# Patient Record
Sex: MALE | Race: OTHER
[De-identification: names, ages, dates, MRNs, and addresses within clinical notes are randomized per-mention and may not be internally consistent; named-entity substitution may affect disease eponyms.]

---

## 2018-12-28 ENCOUNTER — HOSPITAL ENCOUNTER (INPATIENT)
Dept: HOSPITAL 72 - EMR | Age: 68
LOS: 4 days | Discharge: NURSING, REHAB OR LONG-TERM CARE | DRG: 101 | End: 2019-01-01
Payer: MEDICARE

## 2018-12-28 VITALS
HEIGHT: 64 IN | DIASTOLIC BLOOD PRESSURE: 67 MMHG | BODY MASS INDEX: 22.53 KG/M2 | WEIGHT: 132 LBS | SYSTOLIC BLOOD PRESSURE: 108 MMHG

## 2018-12-28 DIAGNOSIS — C79.31: ICD-10-CM

## 2018-12-28 DIAGNOSIS — C18.9: ICD-10-CM

## 2018-12-28 DIAGNOSIS — K21.9: ICD-10-CM

## 2018-12-28 DIAGNOSIS — N17.9: ICD-10-CM

## 2018-12-28 DIAGNOSIS — Z43.3: ICD-10-CM

## 2018-12-28 DIAGNOSIS — G40.89: Primary | ICD-10-CM

## 2018-12-28 DIAGNOSIS — I10: ICD-10-CM

## 2018-12-28 LAB
ADD MANUAL DIFF: YES
ALBUMIN SERPL-MCNC: 2.1 G/DL (ref 3.4–5)
ALBUMIN/GLOB SERPL: 0.5 {RATIO} (ref 1–2.7)
ALP SERPL-CCNC: 136 U/L (ref 46–116)
ALT SERPL-CCNC: 123 U/L (ref 12–78)
ANION GAP SERPL CALC-SCNC: 14 MMOL/L (ref 5–15)
APPEARANCE UR: CLEAR
APTT PPP: (no result) S
AST SERPL-CCNC: 35 U/L (ref 15–37)
BILIRUB SERPL-MCNC: 0.2 MG/DL (ref 0.2–1)
BUN SERPL-MCNC: 30 MG/DL (ref 7–18)
CALCIUM SERPL-MCNC: 8.4 MG/DL (ref 8.5–10.1)
CHLORIDE SERPL-SCNC: 104 MMOL/L (ref 98–107)
CO2 SERPL-SCNC: 18 MMOL/L (ref 21–32)
CREAT SERPL-MCNC: 1.5 MG/DL (ref 0.55–1.3)
ERYTHROCYTE [DISTWIDTH] IN BLOOD BY AUTOMATED COUNT: 21 % (ref 11.6–14.8)
GLOBULIN SER-MCNC: 4.4 G/DL
GLUCOSE UR STRIP-MCNC: NEGATIVE MG/DL
HCT VFR BLD CALC: 30.4 % (ref 42–52)
HGB BLD-MCNC: 10 G/DL (ref 14.2–18)
KETONES UR QL STRIP: NEGATIVE
LEUKOCYTE ESTERASE UR QL STRIP: (no result)
MCV RBC AUTO: 75 FL (ref 80–99)
NITRITE UR QL STRIP: NEGATIVE
PH UR STRIP: 5 [PH] (ref 4.5–8)
PLATELET # BLD: 240 K/UL (ref 150–450)
POTASSIUM SERPL-SCNC: 3.9 MMOL/L (ref 3.5–5.1)
PROT UR QL STRIP: (no result)
RBC # BLD AUTO: 4.07 M/UL (ref 4.7–6.1)
SODIUM SERPL-SCNC: 136 MMOL/L (ref 136–145)
SP GR UR STRIP: 1.01 (ref 1–1.03)
UROBILINOGEN UR-MCNC: NORMAL MG/DL (ref 0–1)
WBC # BLD AUTO: 9.7 K/UL (ref 4.8–10.8)

## 2018-12-28 PROCEDURE — 70450 CT HEAD/BRAIN W/O DYE: CPT

## 2018-12-28 PROCEDURE — 82962 GLUCOSE BLOOD TEST: CPT

## 2018-12-28 PROCEDURE — 96375 TX/PRO/DX INJ NEW DRUG ADDON: CPT

## 2018-12-28 PROCEDURE — 36415 COLL VENOUS BLD VENIPUNCTURE: CPT

## 2018-12-28 PROCEDURE — 70553 MRI BRAIN STEM W/O & W/DYE: CPT

## 2018-12-28 PROCEDURE — 81003 URINALYSIS AUTO W/O SCOPE: CPT

## 2018-12-28 PROCEDURE — 99285 EMERGENCY DEPT VISIT HI MDM: CPT

## 2018-12-28 PROCEDURE — 80053 COMPREHEN METABOLIC PANEL: CPT

## 2018-12-28 PROCEDURE — 95819 EEG AWAKE AND ASLEEP: CPT

## 2018-12-28 PROCEDURE — 85007 BL SMEAR W/DIFF WBC COUNT: CPT

## 2018-12-28 PROCEDURE — 87081 CULTURE SCREEN ONLY: CPT

## 2018-12-28 PROCEDURE — 80329 ANALGESICS NON-OPIOID 1 OR 2: CPT

## 2018-12-28 PROCEDURE — 93005 ELECTROCARDIOGRAM TRACING: CPT

## 2018-12-28 PROCEDURE — 96365 THER/PROPH/DIAG IV INF INIT: CPT

## 2018-12-28 PROCEDURE — 93970 EXTREMITY STUDY: CPT

## 2018-12-28 PROCEDURE — 84484 ASSAY OF TROPONIN QUANT: CPT

## 2018-12-28 PROCEDURE — 85025 COMPLETE CBC W/AUTO DIFF WBC: CPT

## 2018-12-28 NOTE — NUR
ED Nurse Note:

Pt brought in ED by ambulance from [facility]. C/o seizure episode 
today. No fall or injury were reported. Pt is A/O X4. Bp 108/ 67, Hr 104. O2 
sat 100% on room air. Pt had Colostomy on abdomen with good grainage. IV access 
on right arm. Skin intact. Incontenet. Dr. Allison at bed side, waiting for 
orders.

## 2018-12-28 NOTE — DIAGNOSTIC IMAGING REPORT
EXAM:

  CT Head Without Intravenous Contrast

 

CLINICAL HISTORY:

  AMS

 

TECHNIQUE:

  Axial computed tomography images of the head/brain without intravenous 

contrast.  CTDI is 0.15, 70.38 mGy and DLP is 1354 mGy-cm.  One or more 

of the following dose reduction techniques were used: automated exposure 

control, adjustment of the mA and/or kV according to patient size, use of 

iterative reconstruction technique.

 

COMPARISON:

  No relevant prior studies available.

 

FINDINGS:

  Brain:  Large areas of hypoattenuation within the left posterior 

frontal/parietal lobe measuring up to 5.5 cm.  Findings are nonspecific 

and may represent underlying neoplasm, infection, or edema infarct.  No 

hemorrhage.  No significant white matter disease.

  Ventricles:  Unremarkable.  No ventriculomegaly.

  Bones/joints:  Unremarkable.  No acute fracture.

  Soft tissues:  Unremarkable.

  Sinuses:  Unremarkable as visualized.  No acute sinusitis.

  Mastoid air cells:  Unremarkable as visualized.  No mastoid effusion.

 

IMPRESSION:     

  Large areas of hypoattenuation within the left posterior 

frontal/parietal lobe measuring up to 5.5 cm.  Findings are nonspecific 

and may represent underlying neoplasm, infection, or edema infarct.  

Consider contrast enhanced MRI for further evaluation.

## 2018-12-28 NOTE — EMERGENCY ROOM REPORT
History of Present Illness


General


Chief Complaint:  Seizure


Source:  Patient, EMS





Present Illness


HPI


Patient is a 68-year-old male brought in by EMS after witnessed seizure at 

nursing facility.  Patient prior history of adenocarcinoma.  He was noted to 

have prior history of colostomy placement.  History is markedly limited by 

patient's postictal status.  Patient was sent in from [facility].  Patient 

denied any complaints after more alert.


Allergies:  


Coded Allergies:  


     No Known Allergies (Unverified , 12/28/18)





Patient History


Past Medical History:  see triage record


Reviewed Nursing Documentation:  PMH: Agreed; PSxH: Agreed





Nursing Documentation-PMH


Past Medical History:  No History, Except For





Review of Systems


All Other Systems:  limited - by poor historian





Physical Exam





Vital Signs








  Date Time  Temp Pulse Resp B/P (MAP) Pulse Ox O2 Delivery O2 Flow Rate FiO2


 


12/28/18 21:13  120 18 146/74 97 Room Air  


 


12/28/18 21:17 98.6       








Sp02 EP Interpretation:  reviewed, normal


General Appearance:  normal inspection, no apparent distress, alert, 

Chronically Ill


Head:  atraumatic


ENT:  normal ENT inspection, hearing grossly normal, normal voice


Neck:  normal inspection, full range of motion, supple, no bony tend


Respiratory:  normal inspection, lungs clear, normal breath sounds, no 

respiratory distress, no retraction, no wheezing


Cardiovascular #1:  regular rate, rhythm, no edema


Gastrointestinal:  soft, no guarding, no hernia, other - colostomy


Genitourinary:  no CVA tenderness


Musculoskeletal:  normal inspection, back normal, normal range of motion


Neurologic:  normal inspection, alert, responsive, CNs III-XII nml as tested, 

speech normal, other - moves all extremities


Psychiatric:  normal inspection, judgement/insight normal, mood/affect normal


Skin:  normal inspection, normal color, no rash





Medical Decision Making


Diagnostic Impression:  


 Primary Impression:  


 Epileptic seizure, generalized


 Additional Impression:  


 Left parietal lobe lesion


ER Course


Patient presented for new onset seizure.  Differential diagnosis includes is 

not limited to CVA, intracranial hemorrhage, mass lesion, electrolyte 

abnormality, meningitis among others.  Because of complexity of patient's case 

laboratory testing and imaging studies were ordered.  Patient appear to have a 

nonfocal neurologic exam.  Patient was given IV Keppra.  He was given IV 

Decadron due to some evidence of edema to left parietal lobe.  This may 

represent an underlying mass and patient had known history of adenocarcinoma.  

Patient is followed by Dr. Poole.  Patient will be admitted to Dr. Thompson for 

further workup due to covering physician for Dr. Poole.





Labs








Test


  12/28/18


22:02 12/28/18


22:27


 


White Blood Count


  9.7 K/UL


(4.8-10.8) 


 


 


Red Blood Count


  4.07 M/UL


(4.70-6.10) 


 


 


Hemoglobin


  10.0 G/DL


(14.2-18.0) 


 


 


Hematocrit


  30.4 %


(42.0-52.0) 


 


 


Mean Corpuscular Volume 75 FL (80-99)  


 


Mean Corpuscular Hemoglobin


  24.6 PG


(27.0-31.0) 


 


 


Mean Corpuscular Hemoglobin


Concent 32.9 G/DL


(32.0-36.0) 


 


 


Red Cell Distribution Width


  21.0 %


(11.6-14.8) 


 


 


Platelet Count


  240 K/UL


(150-450) 


 


 


Mean Platelet Volume


  5.6 FL


(6.5-10.1) 


 


 


Neutrophils (%) (Auto)  % (45.0-75.0)  


 


Lymphocytes (%) (Auto)  % (20.0-45.0)  


 


Monocytes (%) (Auto)  % (1.0-10.0)  


 


Eosinophils (%) (Auto)  % (0.0-3.0)  


 


Basophils (%) (Auto)  % (0.0-2.0)  


 


Differential Total Cells


Counted 100 


  


 


 


Neutrophils % (Manual) 86 % (45-75)  


 


Lymphocytes % (Manual) 9 % (20-45)  


 


Monocytes % (Manual) 5 % (1-10)  


 


Eosinophils % (Manual) 0 % (0-3)  


 


Basophils % (Manual) 0 % (0-2)  


 


Band Neutrophils 0 % (0-8)  


 


Platelet Estimate Adequate  


 


Platelet Morphology Normal  


 


Red Blood Cell Morphology Normal  


 


Sodium Level


  136 MMOL/L


(136-145) 


 


 


Potassium Level


  3.9 MMOL/L


(3.5-5.1) 


 


 


Chloride Level


  104 MMOL/L


() 


 


 


Carbon Dioxide Level


  18 MMOL/L


(21-32) 


 


 


Anion Gap


  14 mmol/L


(5-15) 


 


 


Blood Urea Nitrogen


  30 mg/dL


(7-18) 


 


 


Creatinine


  1.5 MG/DL


(0.55-1.30) 


 


 


Estimat Glomerular Filtration


Rate 46.8 mL/min


(>60) 


 


 


Glucose Level


  111 MG/DL


() 


 


 


Calcium Level


  8.4 MG/DL


(8.5-10.1) 


 


 


Total Bilirubin


  0.2 MG/DL


(0.2-1.0) 


 


 


Aspartate Amino Transf


(AST/SGOT) 35 U/L (15-37) 


  


 


 


Alanine Aminotransferase


(ALT/SGPT) 123 U/L


(12-78) 


 


 


Alkaline Phosphatase


  136 U/L


() 


 


 


Troponin I


  0.001 ng/mL


(0.000-0.056) 


 


 


Total Protein


  6.5 G/DL


(6.4-8.2) 


 


 


Albumin


  2.1 G/DL


(3.4-5.0) 


 


 


Globulin 4.4 g/dL  


 


Albumin/Globulin Ratio 0.5 (1.0-2.7)  


 


Serum Alcohol < 3 mg/dL  


 


Urine Color  Pale yellow 


 


Urine Appearance  Clear 


 


Urine pH  5 (4.5-8.0) 


 


Urine Specific Gravity


  


  1.010


(1.005-1.035)


 


Urine Protein  2+ (NEGATIVE) 


 


Urine Glucose (UA)


  


  Negative


(NEGATIVE)


 


Urine Ketones


  


  Negative


(NEGATIVE)


 


Urine Blood  1+ (NEGATIVE) 


 


Urine Nitrite


  


  Negative


(NEGATIVE)


 


Urine Bilirubin


  


  Negative


(NEGATIVE)


 


Urine Urobilinogen


  


  Normal MG/DL


(0.0-1.0)


 


Urine Leukocyte Esterase  1+ (NEGATIVE) 


 


Urine RBC


  


  2-4 /HPF (0 -


0)


 


Urine WBC


  


  0-2 /HPF (0 -


0)


 


Urine Squamous Epithelial


Cells 


  None /LPF


(NONE/OCC)


 


Urine Amorphous Sediment


  


  Few /LPF


(NONE)


 


Urine Bacteria


  


  Few /HPF


(NONE)











Last Vital Signs








  Date Time  Temp Pulse Resp B/P (MAP) Pulse Ox O2 Delivery O2 Flow Rate FiO2


 


12/28/18 21:17 98.6 104 18 108/67 97 Room Air  








Status:  improved


Disposition:  ADMITTED AS INPATIENT


Condition:  Stable


Referrals:  


Sammy Poole MD (PCP)











Haseeb Allison MD Dec 28, 2018 23:37

## 2018-12-29 VITALS — SYSTOLIC BLOOD PRESSURE: 94 MMHG | DIASTOLIC BLOOD PRESSURE: 57 MMHG

## 2018-12-29 VITALS — SYSTOLIC BLOOD PRESSURE: 97 MMHG | DIASTOLIC BLOOD PRESSURE: 58 MMHG

## 2018-12-29 VITALS — SYSTOLIC BLOOD PRESSURE: 96 MMHG | DIASTOLIC BLOOD PRESSURE: 62 MMHG

## 2018-12-29 VITALS — DIASTOLIC BLOOD PRESSURE: 71 MMHG | SYSTOLIC BLOOD PRESSURE: 105 MMHG

## 2018-12-29 VITALS — DIASTOLIC BLOOD PRESSURE: 64 MMHG | SYSTOLIC BLOOD PRESSURE: 108 MMHG

## 2018-12-29 LAB
ADD MANUAL DIFF: YES
ALBUMIN SERPL-MCNC: 2 G/DL (ref 3.4–5)
ALBUMIN/GLOB SERPL: 0.5 {RATIO} (ref 1–2.7)
ALP SERPL-CCNC: 122 U/L (ref 46–116)
ALT SERPL-CCNC: 102 U/L (ref 12–78)
ANION GAP SERPL CALC-SCNC: 10 MMOL/L (ref 5–15)
AST SERPL-CCNC: 27 U/L (ref 15–37)
BILIRUB SERPL-MCNC: 0.2 MG/DL (ref 0.2–1)
BUN SERPL-MCNC: 29 MG/DL (ref 7–18)
CALCIUM SERPL-MCNC: 8.5 MG/DL (ref 8.5–10.1)
CHLORIDE SERPL-SCNC: 107 MMOL/L (ref 98–107)
CO2 SERPL-SCNC: 21 MMOL/L (ref 21–32)
CREAT SERPL-MCNC: 1.2 MG/DL (ref 0.55–1.3)
ERYTHROCYTE [DISTWIDTH] IN BLOOD BY AUTOMATED COUNT: 23.9 % (ref 11.6–14.8)
GLOBULIN SER-MCNC: 4 G/DL
HCT VFR BLD CALC: 28.5 % (ref 42–52)
HGB BLD-MCNC: 9.1 G/DL (ref 14.2–18)
MCV RBC AUTO: 76 FL (ref 80–99)
PLATELET # BLD: 237 K/UL (ref 150–450)
POTASSIUM SERPL-SCNC: 4.1 MMOL/L (ref 3.5–5.1)
RBC # BLD AUTO: 3.75 M/UL (ref 4.7–6.1)
SODIUM SERPL-SCNC: 138 MMOL/L (ref 136–145)
WBC # BLD AUTO: 8.7 K/UL (ref 4.8–10.8)

## 2018-12-29 RX ADMIN — LEVETIRACETAM SCH MLS/HR: 5 INJECTION INTRAVENOUS at 08:20

## 2018-12-29 RX ADMIN — LEVETIRACETAM SCH MLS/HR: 5 INJECTION INTRAVENOUS at 21:28

## 2018-12-29 RX ADMIN — DOCUSATE SODIUM SCH MG: 100 CAPSULE, LIQUID FILLED ORAL at 08:20

## 2018-12-29 NOTE — NUR
NURSE NOTES:

Spoke with MD Donna. Regarding patients arrival to unit, new orders received and carried 
out. Will continue plan of care and monitor for any changes in condition.

## 2018-12-29 NOTE — NUR
NURSE NOTES: Report received from RAYMOND Smith. Pt is lying comfortably in bed in semi-fowlers 
position with no signs of distress. Pt is A+Ox4 and denies pain/SOB. IV site is patent, 
intact, and saline locked. Respirations are even and unlabored on room air. Bed is at lowest 
position, brakes engaged, siderailsx2, bed alarm on, and call light within reach. Pt is in 
stable condition at this time; will continue to monitor.

## 2018-12-29 NOTE — NUR
TRANSFER TO FLOOR:

Patient transferred to Tele/ 208 as ordered.   Report given to Navjot/RN.  
Belongings sent with Pt and re-checked with RN. Pt is A/O X4.

## 2018-12-29 NOTE — NUR
HAND-OFF: 

Report given to RAYMOND Drummond. Patient is in stable condition. Endorsed plan of care.

## 2018-12-29 NOTE — NUR
NURSE NOTES:

Received report from RAYMOND Morfin. Patient is in stable condition. No acute distress/SOB noted. 
Will continue plan of care.

## 2018-12-29 NOTE — NUR
NURSE NOTES:

Received report from VERONICA Pitts RN. regarding patient transfer to floor from ED.Patient 
arrived via gurney and assisted by staff to transfer to bed. Belongings checklist done at 
bedside, Kept clean, comfortable in bed at all times. Safety precaution in place. Will call 
attending MD for orders

## 2018-12-29 NOTE — HISTORY AND PHYSICAL REPORT
DATE OF ADMISSION:  12/28/2018

CHIEF COMPLAINT:  New onset seizure.



HISTORY OF PRESENT ILLNESS:  The patient is a 68-year-old male.  He has a

history of hypertension and GERD.  He has a history of rectosigmoid

carcinoma with brain metastasis.  He was recently transferred to a skilled

nursing facility.  The patient is a poor historian.  According to the

patient, he has a history of colon cancer.  He has a colostomy.

Currently, he is without complaint, but at the nursing home, he developed

new onset seizures, transferred to the emergency room.  On evaluation

there, he had a CAT scan of the head that showed a large area of

hypoattenuation in the left posterior frontal parietal lobe measuring 5.5

cm.  He was given a dose of steroids and IV Keppra and is now admitted for

further evaluation and care.



PAST MEDICAL HISTORY:  As above.  Anemia, gout, and history of UTI.



PAST SURGICAL HISTORY:  Prior history of a colostomy.



CURRENT MEDICATIONS:  Reconciled and reviewed.



ALLERGIES:  None.



FAMILY HISTORY:  None.



SOCIAL HISTORY:  There is no known history of tobacco, ethanol, or drugs.



REVIEW OF SYSTEMS:  GENERAL:  No fevers or chills.  HEENT:  No headaches or

visual changes.  CARDIOPULMONARY:  No chest pain or shortness of breath.

GASTROINTESTINAL:  No nausea or vomiting.  Positive history of colostomy.

GENITOURINARY:  No urgency or frequency.  MUSCULOSKELETAL:  No joint

pain or swelling.    NEUROLOGIC:  No prior history of seizures.



PHYSICAL EXAMINATION:

VITAL SIGNS:  Temperature 98, pulse 104, respirations 18, and blood

pressure 108/67.

GENERAL:  The patient is well developed, no apparent distress.  He is

awake, alert, and oriented x4.

NECK:  Supple.

HEART:  Regular rate and rhythm.

LUNGS:  Clear.

ABDOMEN:  Soft, nontender, and nondistended.  There was a colostomy on the

right side of the abdomen that is straining soft brown stool.

EXTREMITIES:  Without clubbing, cyanosis, or edema.  Motor strength is 5/5

bilaterally.



LABORATORY DATA:  White count 9, hemoglobin 10, hematocrit 30, and

platelets 240.  Sodium 136, potassium 3.9, chloride 104, bicarb 18, BUN

30, and creatinine 1.5.



ASSESSMENT:  This is a pleasant male, who complaints of new onset seizure

secondary to metastatic colon cancer, acute renal failure.



PLAN:  We will increase the patient's steroids, IV Keppra has been started.

Neuro consultation will be obtained.  We will order an MRI of the brain.

Gentle hydration.  DVT and stress ulcer prophylaxis will be instituted.

Plan of care was discussed with the patient at the bedside.









  ______________________________________________

  Guillermo Thompson M.D.





DR:  KRIS

D:  12/29/2018 09:40

T:  12/29/2018 23:18

JOB#:  [id number]

CC:

## 2018-12-30 VITALS — SYSTOLIC BLOOD PRESSURE: 121 MMHG | DIASTOLIC BLOOD PRESSURE: 71 MMHG

## 2018-12-30 VITALS — SYSTOLIC BLOOD PRESSURE: 100 MMHG | DIASTOLIC BLOOD PRESSURE: 61 MMHG

## 2018-12-30 VITALS — DIASTOLIC BLOOD PRESSURE: 56 MMHG | SYSTOLIC BLOOD PRESSURE: 104 MMHG

## 2018-12-30 VITALS — DIASTOLIC BLOOD PRESSURE: 64 MMHG | SYSTOLIC BLOOD PRESSURE: 123 MMHG

## 2018-12-30 VITALS — SYSTOLIC BLOOD PRESSURE: 89 MMHG | DIASTOLIC BLOOD PRESSURE: 49 MMHG

## 2018-12-30 VITALS — DIASTOLIC BLOOD PRESSURE: 60 MMHG | SYSTOLIC BLOOD PRESSURE: 113 MMHG

## 2018-12-30 RX ADMIN — LEVETIRACETAM SCH MLS/HR: 5 INJECTION INTRAVENOUS at 08:27

## 2018-12-30 RX ADMIN — DOCUSATE SODIUM SCH MG: 100 CAPSULE, LIQUID FILLED ORAL at 08:27

## 2018-12-30 RX ADMIN — LEVETIRACETAM SCH MG: 100 SOLUTION ORAL at 21:06

## 2018-12-30 NOTE — NUR
CASE MANAGEMENT: INITIAL REVIEW 



69 YO M BIBA FROM [facility] 



CC: SZ 



PMHx: ADENOCARCINOMA. 



SI:NEW ONSET SZ. 

T 98.6  RR 18 B/P 146/74 SATS 97% ON RA 

CO 2 18 BUN 30 CR 1.5 GLUCOSE 111 CA 8.4   



IS: NS BOLUS X1 



****PATIENT ADMITTED TO [facility] 12/28/2018 @ 4830****



DCP: PATIENT TO BE DISCHARGED TO SNF ONCE MEDICALLY CLEARED. 



PLAN OF CARE: 

EEG 

NEURO EVAL

## 2018-12-30 NOTE — NUR
NURSE NOTES:

Received report from RAYMOND Smith. Pt is resting in bed. Bed is in lowest position, side rails up 
X2, and call light is within reach. Will continue to monitor.

## 2018-12-30 NOTE — NUR
HAND-OFF: 

Report given to RAYMOND Valladares. Patient is in stable condition. No acute distress/SOB noted. 
Endorsed plan of care..

## 2018-12-30 NOTE — GENERAL PROGRESS NOTE
Assessment/Plan


Problem List:  


(1) Colon cancer


ICD Codes:  C18.9 - Malignant neoplasm of colon, unspecified


SNOMED:  542489414


(2) Epileptic seizure, generalized


ICD Codes:  G40.309 - Generalized idiopathic epilepsy and epileptic syndromes, 

not intractable, without status epilepticus


SNOMED:  83011137


(3) Left parietal lobe lesion


ICD Codes:  G93.9 - Disorder of brain, unspecified


SNOMED:  573440627, 5268725


Status:  stable


Assessment/Plan


eeg


neuro eval pending


po sz rx


steroids


colostomy care





Subjective


ROS Limited/Unobtainable:  No


Constitutional:  Reports: malaise, weakness


HEENT:  Reports: no symptoms


Cardiovascular:  Reports: no symptoms


Respiratory:  Reports: no symptoms


Gastrointestinal/Abdominal:  Reports: no symptoms


Genitourinary:  Reports: no symptoms


Neurologic/Psychiatric:  Reports: seizure


Endocrine:  Reports: no symptoms


Hematologic/Lymphatic:  Reports: no symptoms


Allergies:  


Coded Allergies:  


     No Known Allergies (Unverified , 12/28/18)


All Systems:  reviewed and negative except above


Subjective


no events. no szs noted. alert. on steroids and iv sz rx





Objective





Last 24 Hour Vital Signs








  Date Time  Temp Pulse Resp B/P (MAP) Pulse Ox O2 Delivery O2 Flow Rate FiO2


 


12/30/18 08:00 98.3 93 20 113/60 (77) 98   


 


12/30/18 04:00 98.0 84 18 89/49 (62) 98   


 


12/30/18 04:00  68      


 


12/30/18 00:00  68      


 


12/30/18 00:00 98.4 71 20 121/71 (88) 98   


 


12/29/18 21:00      Room Air  


 


12/29/18 20:00  86      


 


12/29/18 20:00 97.8 81 18 94/57 (69) 96   


 


12/29/18 16:00  75      


 


12/29/18 16:00 98.1 76 20 97/58 (71) 98   


 


12/29/18 12:00 98.1 87 20 105/71 (82) 100   


 


12/29/18 12:00  70      


 


12/29/18 10:03      Room Air  

















Intake and Output  


 


 12/29/18 12/30/18





 19:00 07:00


 


Intake Total 120 ml 


 


Output Total 750 ml 700 ml


 


Balance -630 ml -700 ml


 


  


 


Intake Oral 120 ml 


 


Output Urine Total 600 ml 700 ml


 


Stool Total 150 ml 


 


# Bowel Movements 1 








Height (Feet):  5


Height (Inches):  4.00


Weight (Pounds):  132


General Appearance:  WD/WN, alert


Neck:  supple


Cardiovascular:  regular rhythm


Respiratory/Chest:  lungs clear, normal breath sounds, no respiratory distress


Abdomen:  normal bowel sounds, non tender, soft, other - colostomy


Edema:  no edema noted Arm (L), no edema noted Arm (R), no edema noted Leg (L), 

no edema noted Leg (R), no edema noted Pedal (L), no edema noted Pedal (R), no 

edema noted Generalized











Guillermo Thompson MD Dec 30, 2018 09:53

## 2018-12-30 NOTE — NUR
NURSE NOTES:

Received report from RAYMOND Rojas. Patient is in stable condition. No acute distress/SOB noted. 
No seizure noted. Will continue plan of care.

## 2018-12-31 VITALS — SYSTOLIC BLOOD PRESSURE: 107 MMHG | DIASTOLIC BLOOD PRESSURE: 67 MMHG

## 2018-12-31 VITALS — DIASTOLIC BLOOD PRESSURE: 71 MMHG | SYSTOLIC BLOOD PRESSURE: 104 MMHG

## 2018-12-31 VITALS — DIASTOLIC BLOOD PRESSURE: 62 MMHG | SYSTOLIC BLOOD PRESSURE: 104 MMHG

## 2018-12-31 VITALS — DIASTOLIC BLOOD PRESSURE: 60 MMHG | SYSTOLIC BLOOD PRESSURE: 95 MMHG

## 2018-12-31 VITALS — DIASTOLIC BLOOD PRESSURE: 60 MMHG | SYSTOLIC BLOOD PRESSURE: 99 MMHG

## 2018-12-31 VITALS — DIASTOLIC BLOOD PRESSURE: 67 MMHG | SYSTOLIC BLOOD PRESSURE: 105 MMHG

## 2018-12-31 RX ADMIN — LEVETIRACETAM SCH MG: 100 SOLUTION ORAL at 20:09

## 2018-12-31 RX ADMIN — DOCUSATE SODIUM SCH MG: 100 CAPSULE, LIQUID FILLED ORAL at 08:52

## 2018-12-31 RX ADMIN — LEVETIRACETAM SCH MG: 100 SOLUTION ORAL at 08:52

## 2018-12-31 NOTE — NUR
NURSE NOTES:

Report received from Imani ANDRADE. Pt is sitting in bed and eating his breakfast in stable 
condition.Pt is awake, alert, and oriented x4. Pt is on room air and breathing is even and 
unlabored. No signs and symptoms of acute distress at this time. Pt denies pain at this 
time. Bed is in lowest position with brake engaged, side rails up x2, and bed alarm on. Call 
light within reach. Will continue to monitor and follow plan of care.

## 2018-12-31 NOTE — NUR
NURSE NOTES:

Trying to call facility ([facility]) for discharge, Nurse asked me to call back 
in 10 minutes so he can get a chance to her charge nurse. Tried my call back , somebody 
picked up the phone and said there is no nurse in [facility],  tried again and 
the same person picked up the phone. Dr. Thompson informed.

## 2018-12-31 NOTE — GENERAL PROGRESS NOTE
Assessment/Plan


Problem List:  


(1) Colon cancer


ICD Codes:  C18.9 - Malignant neoplasm of colon, unspecified


SNOMED:  237916065


(2) Epileptic seizure, generalized


ICD Codes:  G40.309 - Generalized idiopathic epilepsy and epileptic syndromes, 

not intractable, without status epilepticus


SNOMED:  88196865


(3) Left parietal lobe lesion


ICD Codes:  G93.9 - Disorder of brain, unspecified


SNOMED:  802471376, 5956805


Status:  stable, progressing


Assessment/Plan


eeg


MRI brain


po sz rx


steroids


colostomy care


dc planning with outpt onc follow up





Subjective


ROS Limited/Unobtainable:  No


Constitutional:  Reports: malaise, weakness


HEENT:  Reports: no symptoms


Cardiovascular:  Reports: no symptoms


Respiratory:  Reports: no symptoms


Gastrointestinal/Abdominal:  Reports: no symptoms


Genitourinary:  Reports: no symptoms


Neurologic/Psychiatric:  Reports: pre-existing deficit, seizure


Endocrine:  Reports: no symptoms


Hematologic/Lymphatic:  Reports: anemia


Allergies:  


Coded Allergies:  


     No Known Allergies (Unverified , 12/28/18)


All Systems:  reviewed and negative except above


Subjective


no events. no szs noted. alert. on steroids and iv sz rx


mri scheduled for today. feels "fine"





Objective





Last 24 Hour Vital Signs








  Date Time  Temp Pulse Resp B/P (MAP) Pulse Ox O2 Delivery O2 Flow Rate FiO2


 


12/31/18 08:00 99.1  20 105/67 (80) 96   


 


12/31/18 04:00 97.4  22 107/67 (80) 97   


 


12/31/18 04:00  96      


 


12/31/18 00:00  101      


 


12/31/18 00:00 98.4 104 20 95/60 (72) 97   


 


12/30/18 21:00      Room Air  


 


12/30/18 20:00 99.7 100 20 104/56 (72) 95   


 


12/30/18 20:00  112      


 


12/30/18 16:00 98.8 103 20 123/64 (83) 98   


 


12/30/18 16:00  106      


 


12/30/18 12:00 98.2 93 20 100/61 (74) 98   


 


12/30/18 12:00  92      


 


12/30/18 09:00      Room Air  

















Intake and Output  


 


 12/30/18 12/31/18





 18:59 06:59


 


Intake Total  100 ml


 


Output Total  875 ml


 


Balance  -775 ml


 


  


 


Intake Oral  100 ml


 


Output Urine Total  875 ml


 


# Voids  2


 


# Bowel Movements  1








Height (Feet):  5


Height (Inches):  4.00


Weight (Pounds):  132


Objective


General Appearance:  WD/WN, alert


Neck:  supple


Cardiovascular:  regular rhythm


Respiratory/Chest:  lungs clear, normal breath sounds, no respiratory distress


Abdomen:  normal bowel sounds, non tender, soft, other - colostomy


Edema:  no edema noted Arm (L), no edema noted Arm (R), no edema noted Leg (L), 

no edema noted Leg (R), no edema noted Pedal (L), no edema noted Pedal (R), no 

edema noted Generalized











Guillermo Thompson MD Dec 31, 2018 08:57

## 2018-12-31 NOTE — DIAGNOSTIC IMAGING REPORT
Indication: History of colon carcinoma, abnormal recent brain CT

 

Technique: sagittal T1 fast spin echo, axial T1 and T2 FLAIR PROPELLER, axial T2 FS

PROPELLER, T2* GRE, axial diffusion weighted images,, coronal T2 FLAIR PROPELLER,

coronal FSPGR BRAVO, post contrast axial and coronal T1 FLAIR PROPELLER images. ADC

and exponential ADC maps generated

 

Comparison: Reference made to CT scan dated 12/20/2018

 

Findings: The posterior left parietal lobe, there is a cystic rim-enhancing mass

which measures 5.1 cm long axis dimension by 3.8 x 3.4 orthogonal short axis

dimensions. This is mostly cystic, with a thin enhancing rim. The visualized sinuses

are clear there is probably a very thin septation within the cyst. There is an of

mural focus of signal which is nonfluid but does not enhance, which measures

approximately 2 cm long axis dimension, at the superior aspect of the lesion. The

superior periphery demonstrates some nodular enhancement. There is also a focus of

nodular enhancement at the anteromedial aspect of the rim. The cyst contents do not

show any diffusion restriction. However, the rim is bright on the diffusion weighted

images. There is a moderate amount of surrounding vasogenic edema. There is local

mass effect, manifested by attenuation of the ipsilateral sulci and of the posterior

aspect of the body of the left lateral ventricle. There is minimal, no more than 1-2

mm, left right midline shift. At the superior aspect of the tumor, there is a small

focus of susceptibility artifact which may indicate some intratumoral hemorrhage..

 

No abnormal areas of restricted diffusion to suggest acute infarction. No acute

hemorrhage. Other than the areas affected by the mass effect, there is age-related

enlargement of the ventricles and extra axial CSF spaces. No other abnormal contrast

enhancement. The vascular flow voids are preserved. Visualized orbits are

unremarkable.. Visualized orbits and sinuses are unremarkable. .

 

Impression: 5.1 x 3.8 x 3.4 cm mostly cystic mass with rim enhancement and focal

areas of nodular enhancement and noncystic signal, minimal intratumoral hemorrhage,

as described. Surrounding edema Main differential considerations include primary

brain malignancy, metastatic neoplasm, cerebral abscess. Other much less likely

differential considerations include tuberculoma, subacute infarct, tumefactive

demyelinating lesion, lymphoma. The absence of diffusion restriction within the cyst,

presence of diffusion restriction of the rim, and the presence of a nodular enhancing

component favors cystic glioblastoma. However, the other etiologies are certainly

possible as well.

 

The above results in mostly local mass effect

 

Age-related changes, as described

 

Negative for acute infarct or acute hemorrhage

## 2018-12-31 NOTE — NUR
***INSURANCE



ALL CLINICALS AND REVIEWS FAXED TO:



[facility]

P:[phone]

F:[phone]

## 2018-12-31 NOTE — NUR
NURSE NOTES:



Received report from RAYMOND Cuellar. Patient is awake lying semi-bourgeois's; resting comfortably. 
No signs of acute distress noted; denies pain at this time. AOx4; able to make needs known. 
Primarily Belarusian speaking. Checked IV site; patent and flushed. No erythema, bleeding, or 
infiltration noted. Right lower quadrant colostomy draining well to gravity. Urinal easily 
accessible. Bed at lowest position, brakes on, siderails up x3. Siderails padded following 
seizure precautions. Suction at bedside. Call light within reach. Will continue to monitor.

## 2018-12-31 NOTE — NUR
CASE MANAGEMENT:REVIEW



12/31/18

SI: NEW ONSET SEIZURE

HR~104   BP~95/60



IS: IV KEPRRA Q12

DECADRON PO BID

**: TELEMETRY STATUS

DCP: FROM FRANCISCO J MACDONALD



PLAN:

EEG RESULTS PENDING

CONTINUE IV KEPPRA

## 2019-01-01 VITALS — DIASTOLIC BLOOD PRESSURE: 60 MMHG | SYSTOLIC BLOOD PRESSURE: 96 MMHG

## 2019-01-01 VITALS — SYSTOLIC BLOOD PRESSURE: 90 MMHG | DIASTOLIC BLOOD PRESSURE: 49 MMHG

## 2019-01-01 VITALS — SYSTOLIC BLOOD PRESSURE: 102 MMHG | DIASTOLIC BLOOD PRESSURE: 69 MMHG

## 2019-01-01 RX ADMIN — DOCUSATE SODIUM SCH MG: 100 CAPSULE, LIQUID FILLED ORAL at 08:33

## 2019-01-01 RX ADMIN — LEVETIRACETAM SCH MG: 100 SOLUTION ORAL at 09:14

## 2019-01-01 NOTE — DISCHARGE SUMMARY
DATE OF ADMISSION:  12/28/2018



DATE OF DISCHARGE:  01/01/2019



ADMISSION DIAGNOSES:

1. Seizures.

2. History of colon cancer with brain metastasis.



DISCHARGE DIAGNOSES:

1. Seizures.

2. History of colon cancer with brain metastasis.



HOSPITAL COURSE:  The patient is a pleasant but unfortunate male with

history of metastatic colon cancer.  He was admitted with new onset

seizures.  He has a known history of metastatic colon cancer to the brain.

He was admitted.  He was placed on steroids and Keppra.  He had a CAT

scan and later MRI of the brain that showed mostly cystic mass with rim

enhancement and focal areas of nodule enhancement consistent with

metastatic neoplasm.  The patient had no further seizures on Keppra and on

steroids.  He will be discharged back to the skilled nursing facility.  He

will follow up with his oncologist in the next week hopefully.



DISCHARGE MEDICATIONS:  Please see discharge medication list for discharge

medications.



DIET:  Regular.



ACTIVITY:  [employer].









  ______________________________________________

  Guillermo Thompson M.D.





DR:  Hebert

D:  01/01/2019 09:44

T:  01/01/2019 14:37

JOB#:  [id number]

CC:

## 2019-01-01 NOTE — NUR
HAND-OFF: 



Report given to RAYMOND Cuellar. Patient is awake lying high-bourgeois's eating breakfast. In stable 
condition. Endorsed to oncoming RN regarding possible discharge later today; verbalized 
understanding.

## 2019-01-01 NOTE — NUR
NURSE NOTES:

Report received from RAYMOND Hughes. Pt is sitting in bed and eating his breakfast in stable 
condition.Pt is awake, alert, and oriented x4. Pt is on room air and breathing is even and 
unlabored. No signs and symptoms of acute distress at this time. Pt denies pain at this 
time. Bed is in lowest position with brake engaged, side rails up x2, and bed alarm on. 
Seizure percussion in place. Call light within reach. Will continue to monitor and follow 
plan of care.

## 2019-01-01 NOTE — ELECTROENCEPHALOGRAM
REQUESTING PHYSICIAN:  Guillermo Thompson M.D.



DATE OF TRACIN2018.



HISTORY:  This EEG was performed on a 68-year-old 

gentleman with history of colon cancer and left parietal lobe 

lesions, who apparently had a generalized seizure.  

The purpose of this EEG was to evaluate the patient for

ictal or interictal phenomena.



TECHNICAL NOTE:  This EEG was performed on a [employer] 

Digital Acquisition Unit with electrodes placed on the scalp 

according to the [employer] 10-20 system.  Scalp-to-scalp 

and scalp-to-ear montages were used.  The EEG was technically 

satisfactory, and was performed predominantly in the awake 

state with a few brief periods of drowsiness.



OBSERVATIONS:  

In the reportedly awake state, the background activity consisted 

of 9- 10 Hz posteriorly predominant, well-developed, alpha

waveforms, which attenuated on eye opening.  



Drowsiness was characterized by dissolution of the alpha rhythm 

and the appearance of slower frequencies in the 5-6 Hz theta range.  



Photic stimulation was performed at various different frequencies

and produced a normal driving response. 



No focal abnormalities or epileptiform discharges were seen.



IMPRESSION:  Normal awake and drowsy EEG.



COMMENT:  A normal EEG does not rule out the seizure disorder.







________________________

Sonido Sousa M.D., M.S.P.H.



DR:  ARABELLA

D:  2019 12:26

T:  2019 03:07

JOB#:  [id number]
MTDD

## 2019-01-01 NOTE — NUR
NURSE NOTES:

Patient discharged per Dr. Thompson's order. All discharge instruction reviewed with patient. 
All patient's belonging return to patient. ID band removed and placed in shredder. Heart 
monitor removed and returned to monitor tech. IV removed. Patient left the hospital in 
stable condition with [facility].

## 2019-01-01 NOTE — NUR
NURSE NOTES:



Patient is asleep lying right lateral recumbent; resting comfortably. No signs of acute 
distress or pain noted at this time.